# Patient Record
Sex: FEMALE | Race: OTHER | HISPANIC OR LATINO | URBAN - METROPOLITAN AREA
[De-identification: names, ages, dates, MRNs, and addresses within clinical notes are randomized per-mention and may not be internally consistent; named-entity substitution may affect disease eponyms.]

---

## 2017-03-23 VITALS
HEART RATE: 84 BPM | OXYGEN SATURATION: 99 % | RESPIRATION RATE: 16 BRPM | TEMPERATURE: 97 F | SYSTOLIC BLOOD PRESSURE: 134 MMHG | HEIGHT: 67 IN | DIASTOLIC BLOOD PRESSURE: 78 MMHG

## 2017-03-23 NOTE — H&P ADULT - NSHPLABSRESULTS_GEN_ALL_CORE
EKG- NSR @ [facility]                        14.3   7.0   )-----------( 202      ( 27 Mar 2017 12:25 )             40.7       27 Mar 2017 12:25    135    |  100    |  16     ----------------------------<  112    3.9     |  29     |  0.70     Ca    9.4        27 Mar 2017 12:25    TPro  8.6    /  Alb  3.8    /  TBili  0.7    /  DBili  x      /  AST  57     /  ALT  88     /  AlkPhos  100    27 Mar 2017 12:25      PT/INR - ( 27 Mar 2017 12:25 )   PT: 11.7 sec;   INR: 1.05          PTT - ( 27 Mar 2017 12:25 )  PTT:31.6 sec    CARDIAC MARKERS ( 27 Mar 2017 12:25 )  x     / x     / 133 U/L / x     / 1.4 ng/mL

## 2017-03-23 NOTE — H&P ADULT - HISTORY OF PRESENT ILLNESS
57 y.o Female with PMHx of       who presented to her cardiologist c/o       She denies experiencing any CP, SOB, palpitations, dizziness, syncope, or decrease in exercise tolerance.      In light of pt's risk factors, above CCS Anginal Class 3 Symptoms, and abnormal Stress test, pt is now referred to [facility] for recommended Cardiac Cath with possible intervention if clinically indicated to r/o suspected underlying CAD. ****SKELETON:          57 y.o Female Current smoker, with PMHx of HTN, dyslipidemia, COPD, Anxiety/Depression, who presented to her cardiologist c/o       She denies experiencing any CP, SOB, palpitations, dizziness, syncope, or decrease in exercise tolerance.  Prior Echo done on 09/23/15 revealed LVEF of 65% with grade 1 diastolic dysfunction, trace MR.      In light of pt's risk factors, above CCS Anginal Class 3 Symptoms,  pt is now referred to [facility] for recommended Cardiac Cath with possible intervention if clinically indicated to r/o suspected underlying CAD. ****SKELETON:          57 y.o Female Current smoker, with PMHx of HTN, dyslipidemia, COPD, Anxiety/Depression, who presented to her cardiologist c/o palpitations      She denies experiencing any CP, SOB, dizziness, syncope, or decrease in exercise tolerance.  Prior Echo done on 09/23/15 revealed LVEF of 65% with grade 1 diastolic dysfunction, trace MR.      In light of pt's risk factors, above CCS Anginal Class 3 Symptoms,  pt is now referred to [facility] for recommended Cardiac Cath with possible intervention if clinically indicated to r/o suspected underlying CAD. 57 y.o Female Strong FHx of early MI's, Current smoker, with PMHx of palpitations, ?murmur, HTN, dyslipidemia, COPD, hemorrhoids, Anxiety/Depression, who presented to her cardiologist c/o mid epigastric Chest Pain radiating to b/l shoulders x 5 weeks. States the pain is intermittent, beginning at rest and resolves on its own. Pain feels "muscular", crampy in nature and is a 5/10 in severity. She denies experiencing any CP, SOB, dizziness, syncope, or decrease in exercise tolerance.  Prior Echo done on 09/23/15 revealed LVEF of 65% with grade 1 diastolic dysfunction, trace MR.  States she had a cardiac cath at [facility] in 2003, which was "normal", per pt.  Of note, pt states she previously was discontinued on ASA 2 years ago after she experienced BRBPR. Follow up colonoscopy revealed "large hemorrhoids but was otherwise normal, per pt. Denies reccurence of BRBPR since discontinuing ASA.     LMP 2+ years ago    In light of pt's risk factors, above CCS Anginal Class 3 Symptoms,  pt is now referred to [facility] for recommended Cardiac Cath with possible intervention if clinically indicated to r/o suspected underlying CAD. 57 y.o Female Strong FHx of early MI's, Current smoker, with PMHx of palpitations, ?murmur, HTN, dyslipidemia, COPD, hemorrhoids, Anxiety/Depression, who presented to her cardiologist c/o mid epigastric Chest Pain radiating to b/l shoulders x 5 weeks. States the pain is intermittent, beginning at rest and resolves with SL nitro. Pain feels "muscular", crampy in nature and is a 5/10 in severity. She denies experiencing any CP, SOB, dizziness, syncope, or decrease in exercise tolerance.  Prior Echo done on 09/23/15 revealed LVEF of 65% with grade 1 diastolic dysfunction, trace MR.  States she had a cardiac cath at [facility] in 2003, which was "normal", per pt.  Of note, pt states she previously was discontinued on ASA 2 years ago after she experienced BRBPR. Follow up colonoscopy revealed "large hemorrhoids but was otherwise normal, per pt. Denies reccurence of BRBPR since discontinuing ASA.     LMP 2+ years ago    In light of pt's risk factors, above CCS Anginal Class 4 Symptoms,  pt is now referred to [facility] for recommended Cardiac Cath with possible intervention if clinically indicated to r/o suspected underlying CAD.

## 2017-03-23 NOTE — H&P ADULT - NSHPPHYSICALEXAM_GEN_ALL_CORE
Appearance: Normal	  HEENT:   Normal oral mucosa, PERRL, EOMI	  Neck: Supple; No Carotid Bruit and 2+ pulses B/L  Cardiovascular: Normal S1 S2, No JVD, No murmurs  Respiratory: Occasional cough, Lungs clear to auscultation, No Rales, Rhonchi, Wheezing	  Gastrointestinal:  Soft, Non-tender, + BS	  Skin: No rashes, No ecchymoses, No cyanosis  Extremities: Normal range of motion, No clubbing, cyanosis or edema  Vascular: Femoral pulses 2+ b/l without bruit, DP 2+ b/l, PT 2+ b/l  Neurologic: Non-focal  Psychiatry: A & O x 3, Mood & affect appropriate

## 2017-03-23 NOTE — H&P ADULT - ASSESSMENT
57 y.o Female Strong FHx of early MI's, Current smoker, with PMHx of palpitations, ?murmur, HTN, dyslipidemia, COPD, hemorrhoids, Anxiety/Depression, who presented to her cardiologist c/o CCS Anginal Class 4 Symptoms who now presents to [facility] for recommended Cardiac Cath with possible intervention if clinically indicated to r/o suspected underlying CAD. 57 y.o Female Strong FHx of early MI's, Current smoker, with PMHx of palpitations, ?murmur, HTN, dyslipidemia, COPD, hemorrhoids, Anxiety/Depression, who presented to her cardiologist c/o CCS Anginal Class 4 Symptoms who now presents to [facility] for recommended Cardiac Cath with possible intervention if clinically indicated to r/o suspected underlying CAD.      Pt has history of BRBPR 2 years ago and was told to discontinue ASA. Had colonoscopy revealing large hemorrhoids H/H today stable (14.1/40.7). Dr. Barcenas is aware. As discussed with Dr. Barcenas, ASA 325mg PO and Plavix 600mg given prior to cardiac cath.    ASA 3, Mallampati 2

## 2017-03-23 NOTE — H&P ADULT - FAMILY HISTORY
Father  Still living? Unknown  Family history of heart attack, Age at diagnosis: Age Unknown  Family history of early CAD, Age at diagnosis: Age Unknown     Mother  Still living? Unknown  Family history of heart attack, Age at diagnosis: Age Unknown  Family history of early CAD, Age at diagnosis: Age Unknown     Sibling  Still living? Unknown  Family history of heart attack, Age at diagnosis: Age Unknown  Family history of early CAD, Age at diagnosis: Age Unknown

## 2017-03-27 ENCOUNTER — OUTPATIENT (OUTPATIENT)
Dept: OUTPATIENT SERVICES | Facility: HOSPITAL | Age: 57
LOS: 1 days | Discharge: NURSING, REHAB OR LONG-TERM CARE | End: 2017-03-27
Payer: COMMERCIAL

## 2017-03-27 DIAGNOSIS — Z90.49 ACQUIRED ABSENCE OF OTHER SPECIFIED PARTS OF DIGESTIVE TRACT: Chronic | ICD-10-CM

## 2017-03-27 LAB
ALBUMIN SERPL ELPH-MCNC: 3.8 G/DL — SIGNIFICANT CHANGE UP (ref 3.4–5)
ALP SERPL-CCNC: 100 U/L — SIGNIFICANT CHANGE UP (ref 40–120)
ALT FLD-CCNC: 88 U/L — HIGH (ref 12–42)
ANION GAP SERPL CALC-SCNC: 6 MMOL/L — LOW (ref 9–16)
APTT BLD: 31.6 SEC — SIGNIFICANT CHANGE UP (ref 27.5–37.4)
AST SERPL-CCNC: 57 U/L — HIGH (ref 15–37)
BASOPHILS NFR BLD AUTO: 0.1 % — SIGNIFICANT CHANGE UP (ref 0–2)
BILIRUB SERPL-MCNC: 0.7 MG/DL — SIGNIFICANT CHANGE UP (ref 0.2–1.2)
BUN SERPL-MCNC: 16 MG/DL — SIGNIFICANT CHANGE UP (ref 7–23)
CALCIUM SERPL-MCNC: 9.4 MG/DL — SIGNIFICANT CHANGE UP (ref 8.5–10.5)
CHLORIDE SERPL-SCNC: 100 MMOL/L — SIGNIFICANT CHANGE UP (ref 96–108)
CHOLEST SERPL-MCNC: 175 MG/DL — SIGNIFICANT CHANGE UP
CK MB CFR SERPL CALC: 1.4 NG/ML — SIGNIFICANT CHANGE UP (ref 0.5–3.6)
CO2 SERPL-SCNC: 29 MMOL/L — SIGNIFICANT CHANGE UP (ref 22–31)
CREAT SERPL-MCNC: 0.7 MG/DL — SIGNIFICANT CHANGE UP (ref 0.5–1.3)
CRP SERPL-MCNC: 0.64 MG/DL — HIGH
EOSINOPHIL NFR BLD AUTO: 2.6 % — SIGNIFICANT CHANGE UP (ref 0–6)
GLUCOSE SERPL-MCNC: 112 MG/DL — HIGH (ref 70–99)
HBA1C BLD-MCNC: 6.8 % — HIGH (ref 4.8–5.6)
HCT VFR BLD CALC: 40.7 % — SIGNIFICANT CHANGE UP (ref 34.5–45)
HDLC SERPL-MCNC: 67 MG/DL — SIGNIFICANT CHANGE UP
HGB BLD-MCNC: 14.3 G/DL — SIGNIFICANT CHANGE UP (ref 11.5–15.5)
INR BLD: 1.05 — SIGNIFICANT CHANGE UP (ref 0.88–1.16)
LIPID PNL WITH DIRECT LDL SERPL: 91 MG/DL — SIGNIFICANT CHANGE UP
LYMPHOCYTES # BLD AUTO: 37.2 % — SIGNIFICANT CHANGE UP (ref 13–44)
MCHC RBC-ENTMCNC: 34.2 PG — HIGH (ref 27–34)
MCHC RBC-ENTMCNC: 35.1 G/DL — SIGNIFICANT CHANGE UP (ref 32–36)
MCV RBC AUTO: 97.4 FL — SIGNIFICANT CHANGE UP (ref 80–100)
MONOCYTES NFR BLD AUTO: 11.2 % — SIGNIFICANT CHANGE UP (ref 2–14)
NEUTROPHILS NFR BLD AUTO: 48.9 % — SIGNIFICANT CHANGE UP (ref 43–77)
PLATELET # BLD AUTO: 202 K/UL — SIGNIFICANT CHANGE UP (ref 150–400)
POTASSIUM SERPL-MCNC: 3.9 MMOL/L — SIGNIFICANT CHANGE UP (ref 3.5–5.3)
POTASSIUM SERPL-SCNC: 3.9 MMOL/L — SIGNIFICANT CHANGE UP (ref 3.5–5.3)
PROT SERPL-MCNC: 8.6 G/DL — HIGH (ref 6.4–8.2)
PROTHROM AB SERPL-ACNC: 11.7 SEC — SIGNIFICANT CHANGE UP (ref 9.8–12.7)
RBC # BLD: 4.18 M/UL — SIGNIFICANT CHANGE UP (ref 3.8–5.2)
RBC # FLD: 15.8 % — SIGNIFICANT CHANGE UP (ref 10.3–16.9)
SODIUM SERPL-SCNC: 135 MMOL/L — SIGNIFICANT CHANGE UP (ref 135–145)
TOTAL CHOLESTEROL/HDL RATIO MEASUREMENT: 2.6 RATIO — SIGNIFICANT CHANGE UP
TRIGL SERPL-MCNC: 84 MG/DL — SIGNIFICANT CHANGE UP
WBC # BLD: 7 K/UL — SIGNIFICANT CHANGE UP (ref 3.8–10.5)
WBC # FLD AUTO: 7 K/UL — SIGNIFICANT CHANGE UP (ref 3.8–10.5)

## 2017-03-27 PROCEDURE — 36415 COLL VENOUS BLD VENIPUNCTURE: CPT

## 2017-03-27 PROCEDURE — 80053 COMPREHEN METABOLIC PANEL: CPT

## 2017-03-27 PROCEDURE — 86140 C-REACTIVE PROTEIN: CPT

## 2017-03-27 PROCEDURE — 93005 ELECTROCARDIOGRAM TRACING: CPT

## 2017-03-27 PROCEDURE — 85025 COMPLETE CBC W/AUTO DIFF WBC: CPT

## 2017-03-27 PROCEDURE — 85610 PROTHROMBIN TIME: CPT

## 2017-03-27 PROCEDURE — 93458 L HRT ARTERY/VENTRICLE ANGIO: CPT

## 2017-03-27 PROCEDURE — C1769: CPT

## 2017-03-27 PROCEDURE — 80061 LIPID PANEL: CPT

## 2017-03-27 PROCEDURE — 83036 HEMOGLOBIN GLYCOSYLATED A1C: CPT

## 2017-03-27 PROCEDURE — 82553 CREATINE MB FRACTION: CPT

## 2017-03-27 PROCEDURE — 93458 L HRT ARTERY/VENTRICLE ANGIO: CPT | Mod: 26

## 2017-03-27 PROCEDURE — 93010 ELECTROCARDIOGRAM REPORT: CPT

## 2017-03-27 PROCEDURE — C1887: CPT

## 2017-03-27 PROCEDURE — 85730 THROMBOPLASTIN TIME PARTIAL: CPT

## 2017-03-27 PROCEDURE — 82550 ASSAY OF CK (CPK): CPT

## 2017-03-27 RX ORDER — ASPIRIN/CALCIUM CARB/MAGNESIUM 324 MG
325 TABLET ORAL ONCE
Qty: 0 | Refills: 0 | Status: COMPLETED | OUTPATIENT
Start: 2017-03-27 | End: 2017-03-27

## 2017-03-27 RX ORDER — PANTOPRAZOLE SODIUM 20 MG/1
1 TABLET, DELAYED RELEASE ORAL
Qty: 0 | Refills: 0 | COMMUNITY

## 2017-03-27 RX ORDER — FLUTICASONE PROPIONATE 50 MCG
1 SPRAY, SUSPENSION NASAL
Qty: 0 | Refills: 0 | COMMUNITY

## 2017-03-27 RX ORDER — CLOPIDOGREL BISULFATE 75 MG/1
600 TABLET, FILM COATED ORAL ONCE
Qty: 0 | Refills: 0 | Status: COMPLETED | OUTPATIENT
Start: 2017-03-27 | End: 2017-03-27

## 2017-03-27 RX ORDER — RANITIDINE HYDROCHLORIDE 150 MG/1
1 TABLET, FILM COATED ORAL
Qty: 0 | Refills: 0 | COMMUNITY

## 2017-03-27 RX ORDER — SODIUM CHLORIDE 9 MG/ML
500 INJECTION, SOLUTION INTRAVENOUS
Qty: 0 | Refills: 0 | Status: DISCONTINUED | OUTPATIENT
Start: 2017-03-27 | End: 2017-03-27

## 2017-03-27 RX ORDER — CHLORHEXIDINE GLUCONATE 213 G/1000ML
1 SOLUTION TOPICAL ONCE
Qty: 0 | Refills: 0 | Status: DISCONTINUED | OUTPATIENT
Start: 2017-03-27 | End: 2017-03-27

## 2017-03-27 RX ORDER — NITROGLYCERIN 6.5 MG
1 CAPSULE, EXTENDED RELEASE ORAL
Qty: 0 | Refills: 0 | COMMUNITY

## 2017-03-27 RX ORDER — CYCLOBENZAPRINE HYDROCHLORIDE 10 MG/1
1 TABLET, FILM COATED ORAL
Qty: 0 | Refills: 0 | COMMUNITY

## 2017-03-27 RX ORDER — METOPROLOL TARTRATE 50 MG
1 TABLET ORAL
Qty: 0 | Refills: 0 | COMMUNITY

## 2017-03-27 RX ORDER — CITALOPRAM 10 MG/1
1 TABLET, FILM COATED ORAL
Qty: 0 | Refills: 0 | COMMUNITY

## 2017-03-27 RX ORDER — SODIUM CHLORIDE 9 MG/ML
1000 INJECTION INTRAMUSCULAR; INTRAVENOUS; SUBCUTANEOUS
Qty: 0 | Refills: 0 | Status: DISCONTINUED | OUTPATIENT
Start: 2017-03-27 | End: 2017-03-27

## 2017-03-27 RX ORDER — ATORVASTATIN CALCIUM 80 MG/1
1 TABLET, FILM COATED ORAL
Qty: 0 | Refills: 0 | COMMUNITY

## 2017-03-27 RX ORDER — PSEUDOEPHEDRINE HCL 30 MG
1 TABLET ORAL
Qty: 0 | Refills: 0 | COMMUNITY

## 2017-03-27 RX ADMIN — SODIUM CHLORIDE 50 MILLILITER(S): 9 INJECTION, SOLUTION INTRAVENOUS at 14:21

## 2017-03-27 RX ADMIN — CLOPIDOGREL BISULFATE 600 MILLIGRAM(S): 75 TABLET, FILM COATED ORAL at 14:21

## 2017-03-27 RX ADMIN — Medication 325 MILLIGRAM(S): at 14:21

## 2017-04-03 DIAGNOSIS — I34.0 NONRHEUMATIC MITRAL (VALVE) INSUFFICIENCY: ICD-10-CM

## 2017-04-03 DIAGNOSIS — I20.8 OTHER FORMS OF ANGINA PECTORIS: ICD-10-CM

## 2017-04-03 DIAGNOSIS — E78.5 HYPERLIPIDEMIA, UNSPECIFIED: ICD-10-CM

## 2017-04-03 DIAGNOSIS — Z82.49 FAMILY HISTORY OF ISCHEMIC HEART DISEASE AND OTHER DISEASES OF THE CIRCULATORY SYSTEM: ICD-10-CM

## 2017-04-03 DIAGNOSIS — I10 ESSENTIAL (PRIMARY) HYPERTENSION: ICD-10-CM
